# Patient Record
Sex: MALE | Race: ASIAN | NOT HISPANIC OR LATINO | URBAN - METROPOLITAN AREA
[De-identification: names, ages, dates, MRNs, and addresses within clinical notes are randomized per-mention and may not be internally consistent; named-entity substitution may affect disease eponyms.]

---

## 2021-05-18 VITALS
HEIGHT: 64.17 IN | HEART RATE: 65 BPM | OXYGEN SATURATION: 99 % | RESPIRATION RATE: 16 BRPM | SYSTOLIC BLOOD PRESSURE: 122 MMHG | DIASTOLIC BLOOD PRESSURE: 76 MMHG | TEMPERATURE: 98 F | WEIGHT: 147.93 LBS

## 2021-05-18 RX ORDER — CHLORHEXIDINE GLUCONATE 213 G/1000ML
1 SOLUTION TOPICAL ONCE
Refills: 0 | Status: DISCONTINUED | OUTPATIENT
Start: 2021-05-19 | End: 2021-05-19

## 2021-05-18 NOTE — H&P ADULT - HISTORY OF PRESENT ILLNESS
SKELETON    Cards: Dr Rich Johns  Covid:  Escort:  Pharmacy: [facility] [address] [place] [address], [address], Phone [phone]    55 y/o Male with FamHx of CAD (parents) and PMHx of HLD, GERD, BPH, who presented to his cardiologist, Dr Rich Johns, c/o gradually worsening, intermittent, non-radiating, substernal chest pressure, worsened with moderate exertion, relieved with rest, over the last few weeks.   Exercise Stress Test on 9/18/20: small amount of ischemia in the inferior location, EF 54%. Echo on 8/28/20: EF 55%, mild MR, trace TR.  In light of pt's risk factors, CCS Class III anginal symptoms, and abnormal stress test, pt presents for cardia cath. SKELETON    Cards: Dr Rich Johns  Covid: 5/17/21, NEGATIVE (in chart)   Escort:   Pharmacy: [facility]. [address] [place] [address], [address], [place] [facility], Phone [phone]    55 y/o Male with FamHx of CAD (parents) and PMHx of HLD, GERD, BPH, who presented to his cardiologist, Dr Rich Johns, c/o gradually worsening, intermittent, non-radiating, substernal chest pressure, worsened with moderate exertion, relieved with rest, over the last few weeks.  Exercise Stress Test on 9/18/20: small amount of ischemia in the inferior location, EF 54%. Echo on 8/28/20: EF 55%, mild MR, trace TR.  In light of pt's risk factors, CCS Class III anginal symptoms, and abnormal stress test, pt presents for cardia catheterization with possible intervention if clinically indicated. Cards: Dr Rich Johns  Covid: 5/17/21, NEGATIVE (in chart)   COVID vaccine: 4/28/21 (Moderna, fully vaccinated)   Escort: wife   Pharmacy: [facility]. [address] [place] [address], [address], [place] [facility], Phone [phone]    55 y/o Male with FamHx of CAD (parents) and PMHx of HLD, GERD, BPH, who presented to his cardiologist, Dr Rich Johns, c/o gradually worsening, intermittent, 7/10 non-radiating, substernal chest pressure with associated SOB, dizziness, palpitations, worsened with moderate exertion, relieved with rest, over the last few weeks.  Pt denies N/V/D, orthopnea, PND, LE edema, fever, chills, cough, sick contact or recent travel. Exercise Stress Test on 9/18/20: small amount of ischemia in the inferior location, EF 54%. Echo on 8/28/20: EF 55%, mild MR, trace TR.  In light of pt's risk factors, CCS Class III anginal symptoms, and abnormal stress test, pt presents for cardia catheterization with possible intervention if clinically indicated.

## 2021-05-18 NOTE — H&P ADULT - NSICDXFAMILYHX_GEN_ALL_CORE_FT
FAMILY HISTORY:  Father  Still living? Unknown  FH: CAD (coronary artery disease), Age at diagnosis: Age Unknown    Mother  Still living? Unknown  FH: CAD (coronary artery disease), Age at diagnosis: Age Unknown

## 2021-05-18 NOTE — H&P ADULT - ASSESSMENT
57 y/o Male with FamHx of CAD (parents) and PMHx of HLD, GERD, BPH, who presented to his cardiologist, Dr Rich Johns, c/o gradually worsening, intermittent, 7/10 non-radiating, substernal chest pressure with associated SOB, dizziness, palpitations, worsened with moderate exertion, relieved with rest, over the last few weeks. In light of pt's risk factors, CCS Class III anginal symptoms, and abnormal stress test, pt presents for cardia catheterization with possible intervention if clinically indicated.    -Consent obtained with [place] ID [id number]  -H/H _____, no signs of active bleeding, last dose of aspirin 81 mg QD 5/18/21. Loaded with aspirin 81 mg X1 and plavix 600 mg x1.  -EF 55% by ECHO, euvolemic on exam, pre cath fluids with NS @ 75 CC/HR     Risks & benefits of procedure and alternative therapy have been explained to the patient including but not limited to: allergic reaction, bleeding w/possible need for blood transfusion, infection, renal and vascular compromise, limb damage, arrhythmia, stroke, vessel dissection/perforation, Myocardial infarction, emergent CABG. Informed consent obtained and in chart.    57 y/o Male with FamHx of CAD (parents) and PMHx of HLD, GERD, BPH, who presented to his cardiologist, Dr Rich Johns, c/o gradually worsening, intermittent, 7/10 non-radiating, substernal chest pressure with associated SOB, dizziness, palpitations, worsened with moderate exertion, relieved with rest, over the last few weeks. In light of pt's risk factors, CCS Class III anginal symptoms, and abnormal stress test, pt presents for cardia catheterization with possible intervention if clinically indicated.    -Consent obtained with [place] , [place] ID [id number]  -H/H not resulted prior to case. outpt H/H 15.2/47.1 5/16/21 at [facility] with no signs of active bleeding, last dose of aspirin 81 mg QD 5/18/21. Load with aspirin 81 mg X1 and plavix 600 mg x1 on table as discussed w/ Fellow Dillon.   -EF 55% by ECHO, euvolemic on exam, pre cath fluids with NS @ 75 CC/HR     Risks & benefits of procedure and alternative therapy have been explained to the patient including but not limited to: allergic reaction, bleeding w/possible need for blood transfusion, infection, renal and vascular compromise, limb damage, arrhythmia, stroke, vessel dissection/perforation, Myocardial infarction, emergent CABG. Informed consent obtained and in chart.

## 2021-05-18 NOTE — H&P ADULT - NSICDXPASTMEDICALHX_GEN_ALL_CORE_FT
PAST MEDICAL HISTORY:  BPH (benign prostatic hyperplasia)     GERD (gastroesophageal reflux disease)     HLD (hyperlipidemia)

## 2021-05-19 ENCOUNTER — OUTPATIENT (OUTPATIENT)
Dept: OUTPATIENT SERVICES | Facility: HOSPITAL | Age: 56
LOS: 1 days | Discharge: NURSING, REHAB OR LONG-TERM CARE | End: 2021-05-19
Payer: MEDICAID

## 2021-05-19 LAB
A1C WITH ESTIMATED AVERAGE GLUCOSE RESULT: 5.7 % — HIGH (ref 4–5.6)
ALBUMIN SERPL ELPH-MCNC: 4.8 G/DL — SIGNIFICANT CHANGE UP (ref 3.3–5)
ALP SERPL-CCNC: 59 U/L — SIGNIFICANT CHANGE UP (ref 40–120)
ALT FLD-CCNC: 20 U/L — SIGNIFICANT CHANGE UP (ref 10–45)
ANION GAP SERPL CALC-SCNC: 10 MMOL/L — SIGNIFICANT CHANGE UP (ref 5–17)
APTT BLD: 33.5 SEC — SIGNIFICANT CHANGE UP (ref 27.5–35.5)
AST SERPL-CCNC: 17 U/L — SIGNIFICANT CHANGE UP (ref 10–40)
BASOPHILS # BLD AUTO: 0.02 K/UL — SIGNIFICANT CHANGE UP (ref 0–0.2)
BASOPHILS NFR BLD AUTO: 0.4 % — SIGNIFICANT CHANGE UP (ref 0–2)
BILIRUB SERPL-MCNC: 0.3 MG/DL — SIGNIFICANT CHANGE UP (ref 0.2–1.2)
BUN SERPL-MCNC: 14 MG/DL — SIGNIFICANT CHANGE UP (ref 7–23)
CALCIUM SERPL-MCNC: 9.9 MG/DL — SIGNIFICANT CHANGE UP (ref 8.4–10.5)
CHLORIDE SERPL-SCNC: 104 MMOL/L — SIGNIFICANT CHANGE UP (ref 96–108)
CHOLEST SERPL-MCNC: 144 MG/DL — SIGNIFICANT CHANGE UP
CK MB CFR SERPL CALC: <1 NG/ML — SIGNIFICANT CHANGE UP (ref 0–6.7)
CK SERPL-CCNC: 78 U/L — SIGNIFICANT CHANGE UP (ref 30–200)
CO2 SERPL-SCNC: 28 MMOL/L — SIGNIFICANT CHANGE UP (ref 22–31)
CREAT SERPL-MCNC: 0.82 MG/DL — SIGNIFICANT CHANGE UP (ref 0.5–1.3)
EOSINOPHIL # BLD AUTO: 0.1 K/UL — SIGNIFICANT CHANGE UP (ref 0–0.5)
EOSINOPHIL NFR BLD AUTO: 2 % — SIGNIFICANT CHANGE UP (ref 0–6)
ESTIMATED AVERAGE GLUCOSE: 117 MG/DL — HIGH (ref 68–114)
GLUCOSE SERPL-MCNC: 96 MG/DL — SIGNIFICANT CHANGE UP (ref 70–99)
HCT VFR BLD CALC: 45.7 % — SIGNIFICANT CHANGE UP (ref 39–50)
HDLC SERPL-MCNC: 30 MG/DL — LOW
HGB BLD-MCNC: 15.2 G/DL — SIGNIFICANT CHANGE UP (ref 13–17)
IMM GRANULOCYTES NFR BLD AUTO: 0 % — SIGNIFICANT CHANGE UP (ref 0–1.5)
INR BLD: 0.96 — SIGNIFICANT CHANGE UP (ref 0.88–1.16)
LIPID PNL WITH DIRECT LDL SERPL: 60 MG/DL — SIGNIFICANT CHANGE UP
LYMPHOCYTES # BLD AUTO: 2.51 K/UL — SIGNIFICANT CHANGE UP (ref 1–3.3)
LYMPHOCYTES # BLD AUTO: 49.9 % — HIGH (ref 13–44)
MCHC RBC-ENTMCNC: 30.3 PG — SIGNIFICANT CHANGE UP (ref 27–34)
MCHC RBC-ENTMCNC: 33.3 GM/DL — SIGNIFICANT CHANGE UP (ref 32–36)
MCV RBC AUTO: 91.2 FL — SIGNIFICANT CHANGE UP (ref 80–100)
MONOCYTES # BLD AUTO: 0.38 K/UL — SIGNIFICANT CHANGE UP (ref 0–0.9)
MONOCYTES NFR BLD AUTO: 7.6 % — SIGNIFICANT CHANGE UP (ref 2–14)
NEUTROPHILS # BLD AUTO: 2.02 K/UL — SIGNIFICANT CHANGE UP (ref 1.8–7.4)
NEUTROPHILS NFR BLD AUTO: 40.1 % — LOW (ref 43–77)
NON HDL CHOLESTEROL: 114 MG/DL — SIGNIFICANT CHANGE UP
NRBC # BLD: 0 /100 WBCS — SIGNIFICANT CHANGE UP (ref 0–0)
PLATELET # BLD AUTO: 318 K/UL — SIGNIFICANT CHANGE UP (ref 150–400)
POTASSIUM SERPL-MCNC: 4.2 MMOL/L — SIGNIFICANT CHANGE UP (ref 3.5–5.3)
POTASSIUM SERPL-SCNC: 4.2 MMOL/L — SIGNIFICANT CHANGE UP (ref 3.5–5.3)
PROT SERPL-MCNC: 7.8 G/DL — SIGNIFICANT CHANGE UP (ref 6–8.3)
PROTHROM AB SERPL-ACNC: 11.5 SEC — SIGNIFICANT CHANGE UP (ref 10.6–13.6)
RBC # BLD: 5.01 M/UL — SIGNIFICANT CHANGE UP (ref 4.2–5.8)
RBC # FLD: 12.1 % — SIGNIFICANT CHANGE UP (ref 10.3–14.5)
SODIUM SERPL-SCNC: 142 MMOL/L — SIGNIFICANT CHANGE UP (ref 135–145)
TRIGL SERPL-MCNC: 268 MG/DL — HIGH
WBC # BLD: 5.03 K/UL — SIGNIFICANT CHANGE UP (ref 3.8–10.5)
WBC # FLD AUTO: 5.03 K/UL — SIGNIFICANT CHANGE UP (ref 3.8–10.5)

## 2021-05-19 PROCEDURE — 85610 PROTHROMBIN TIME: CPT

## 2021-05-19 PROCEDURE — 83036 HEMOGLOBIN GLYCOSYLATED A1C: CPT

## 2021-05-19 PROCEDURE — 80053 COMPREHEN METABOLIC PANEL: CPT

## 2021-05-19 PROCEDURE — 36415 COLL VENOUS BLD VENIPUNCTURE: CPT

## 2021-05-19 PROCEDURE — C1894: CPT

## 2021-05-19 PROCEDURE — 80061 LIPID PANEL: CPT

## 2021-05-19 PROCEDURE — 99152 MOD SED SAME PHYS/QHP 5/>YRS: CPT

## 2021-05-19 PROCEDURE — 82550 ASSAY OF CK (CPK): CPT

## 2021-05-19 PROCEDURE — 85025 COMPLETE CBC W/AUTO DIFF WBC: CPT

## 2021-05-19 PROCEDURE — 93005 ELECTROCARDIOGRAM TRACING: CPT

## 2021-05-19 PROCEDURE — 85730 THROMBOPLASTIN TIME PARTIAL: CPT

## 2021-05-19 PROCEDURE — C1769: CPT

## 2021-05-19 PROCEDURE — C1887: CPT

## 2021-05-19 PROCEDURE — 93010 ELECTROCARDIOGRAM REPORT: CPT

## 2021-05-19 PROCEDURE — 82553 CREATINE MB FRACTION: CPT

## 2021-05-19 PROCEDURE — 93458 L HRT ARTERY/VENTRICLE ANGIO: CPT

## 2021-05-19 RX ORDER — METOPROLOL TARTRATE 50 MG
1 TABLET ORAL
Qty: 0 | Refills: 0 | DISCHARGE

## 2021-05-19 RX ORDER — SODIUM CHLORIDE 9 MG/ML
500 INJECTION INTRAMUSCULAR; INTRAVENOUS; SUBCUTANEOUS
Refills: 0 | Status: DISCONTINUED | OUTPATIENT
Start: 2021-05-19 | End: 2021-05-19

## 2021-05-19 RX ORDER — OMEPRAZOLE 10 MG/1
1 CAPSULE, DELAYED RELEASE ORAL
Qty: 0 | Refills: 0 | DISCHARGE

## 2021-05-19 RX ORDER — TAMSULOSIN HYDROCHLORIDE 0.4 MG/1
1 CAPSULE ORAL
Qty: 0 | Refills: 0 | DISCHARGE

## 2021-05-19 RX ORDER — ASPIRIN/CALCIUM CARB/MAGNESIUM 324 MG
1 TABLET ORAL
Qty: 0 | Refills: 0 | DISCHARGE

## 2021-05-19 RX ORDER — NITROGLYCERIN 6.5 MG
1 CAPSULE, EXTENDED RELEASE ORAL
Qty: 0 | Refills: 0 | DISCHARGE

## 2021-05-19 NOTE — PROGRESS NOTE ADULT - SUBJECTIVE AND OBJECTIVE BOX
Interventional Cardiology [facility] SDA Discharge Note    Patient without complaints. Ambulated and voided without difficulties  Afebrile, VSS  Ext: Right Radial :   no hematoma,  no   bleeding, dressing; C/D/I  Pulses:    intact RAD to baseline     A/P:    55 y/o Male with FamHx of CAD (parents) and PMHx of HLD, GERD, BPH, who presented to his cardiologist, Dr Rich Johns, c/o gradually worsening, intermittent, 7/10 non-radiating, substernal chest pressure with associated SOB, dizziness, palpitations, worsened with moderate exertion, relieved with rest, over the last few weeks.  Pt denies N/V/D, orthopnea, PND, LE edema, fever, chills, cough, sick contact or recent travel. Exercise Stress Test on 9/18/20: small amount of ischemia in the inferior location, EF 54%. Echo on 8/28/20: EF 55%, mild MR, trace TR.  In light of pt's risk factors, CCS Class III anginal symptoms, and abnormal stress test, pt presents for cardia catheterization with possible intervention if clinically indicated.  s/p cardiac cath (5/19/21): non-obstructive CAD; LM normal, LAD mild disease, LCx mild disease, mRCA 30%; LVEDp 7mmHg, No AS. R radial TR band used for hemostasis.     1.	Stable for discharge as per attending Dr. Comer after bed rest, pt voids, groin/wrist stable and 30 minutes of ambulation.  2.	Follow-up with Cardiologist Andreas in 1-2 weeks  3.	Discharged forms signed and copies in chart

## 2021-05-25 DIAGNOSIS — I10 ESSENTIAL (PRIMARY) HYPERTENSION: ICD-10-CM

## 2021-05-25 DIAGNOSIS — R94.39 ABNORMAL RESULT OF OTHER CARDIOVASCULAR FUNCTION STUDY: ICD-10-CM

## 2021-05-25 DIAGNOSIS — I25.119 ATHEROSCLEROTIC HEART DISEASE OF NATIVE CORONARY ARTERY WITH UNSPECIFIED ANGINA PECTORIS: ICD-10-CM

## 2021-05-25 DIAGNOSIS — E78.5 HYPERLIPIDEMIA, UNSPECIFIED: ICD-10-CM

## 2024-08-21 NOTE — H&P ADULT - PSYCHIATRIC
[facility] - Emergency Dept  [facility]  Progress Note    Patient Name: Armando Green  MRN: [id number]  Patient Class: IP- Inpatient   Admission Date: 8/20/2024  Length of Stay: 1 days  Attending Physician: Evelyne Lara MD  Primary Care Provider: Agustin Farnsworth MD        Subjective:     Principal Problem:Alcohol withdrawal        HPI:  57 yo M with PMHx alcohol abuse who presents to the ED with police from [facility] for alcohol withdrawal concerns and delirium. Pt. Reports was taken to psych appointment by son today, but the staff at the clinic noted pt. Delirious, sweating, tremulous. He reportedly started becoming aggressive and was unable to be reasoned with. Provider recommended he be treated for alcohol withdrawal after obtaining collateral from son. He was placed under PEC and police were called and brought patient to the ED. At time of my assessment, pt. Angry about being in the ED, but he denies any SI or HI. He does not display psychotic behavior and appears alert and oriented x 4, but sleepy after being given IV ativan.    Per notes from [facility] today: Since his last visit, he has been tying to detox by reducing his alcohol intake.he wakes up daily with tremors and shakes, has been vomiting every morning, very anxious, has to drink as soon as he gets up. He has been missing days of workUsual estimated consumption estimated to be just over 1/5 daily. He has been drinking for many years, has never been to rehab. He has never had hospitalization from withdrawals, no reported seizures.    Psych hx, previously hospitalized in for 10 days at [place] in early 2016 for paranoid delusions, was put on Zyprexa and Depakote. He has been on Zyprexa and Depakote since then.      Overview/Hospital Course:  Mr Green was admitted to hospital medicine for alcohol withdrawal. Started on valium taper. Psychatiry consulted, PEC in place on admit.     Interval History: Patient seen  at bedside. Reports some tremulousness but denies hallucinations, anxiety, abdominal issues. Overall he is feeling well. Continue valium taper. Psychiatry consulted. Continue PEC for now.    Review of Systems   Constitutional:  Negative for activity change, chills, diaphoresis, fever and unexpected weight change.   HENT:  Negative for congestion and sore throat.    Respiratory:  Negative for cough, shortness of breath and wheezing.    Cardiovascular:  Negative for chest pain, palpitations and leg swelling.   Gastrointestinal:  Negative for abdominal pain, blood in stool, nausea and vomiting.   Genitourinary:  Negative for dysuria and hematuria.   Musculoskeletal:  Negative for arthralgias and neck pain.   Skin:  Negative for color change and rash.   Neurological:  Positive for tremors. Negative for dizziness, seizures and numbness.   Psychiatric/Behavioral:  Negative for hallucinations and suicidal ideas. The patient is not nervous/anxious.      Objective:     Vital Signs (Most Recent):  Temp: 98 °F (36.7 °C) (08/21/24 1000)  Pulse: 66 (08/21/24 1100)  Resp: 20 (08/21/24 1100)  BP: (!) 165/90 (08/21/24 1100)  SpO2: 95 % (08/21/24 1100) Vital Signs (24h Range):  Temp:  [97.8 °F (36.6 °C)-98.3 °F (36.8 °C)] 98 °F (36.7 °C)  Pulse:  [] 66  Resp:  [16-26] 20  SpO2:  [92 %-98 %] 95 %  BP: (136-177)/() 165/90     Weight: 63.5 kg (140 lb)  Body mass index is 23.3 kg/m².    Intake/Output Summary (Last 24 hours) at 8/21/2024 1118  Last data filed at 8/21/2024 0020  Gross per 24 hour   Intake 1096.67 ml   Output --   Net 1096.67 ml         Physical Exam  Vitals reviewed.   Constitutional:       General: He is not in acute distress.     Appearance: He is well-developed.   HENT:      Head: Normocephalic and atraumatic.   Eyes:      Extraocular Movements: Extraocular movements intact.      Pupils: Pupils are equal, round, and reactive to light.   Neck:      Vascular: No JVD.      Trachea: No tracheal deviation.    Cardiovascular:      Rate and Rhythm: Normal rate and regular rhythm.      Heart sounds: No murmur heard.     No friction rub. No gallop.   Pulmonary:      Effort: No respiratory distress.      Breath sounds: Normal breath sounds. No wheezing or rales.   Abdominal:      General: Bowel sounds are normal. There is no distension.      Palpations: Abdomen is soft. There is no mass.      Tenderness: There is no abdominal tenderness.   Musculoskeletal:         General: No deformity.      Cervical back: Neck supple.   Lymphadenopathy:      Cervical: No cervical adenopathy.   Skin:     General: Skin is warm and dry.      Findings: No rash.   Neurological:      Mental Status: He is alert and oriented to person, place, and time.      Motor: Tremor present.             Significant Labs: All pertinent labs within the past 24 hours have been reviewed.  CBC:   Recent Labs   Lab 08/20/24  1120 08/21/24  0333   WBC 5.10 4.24   HGB 16.5 13.3*   HCT 47.5 39.1*   * 87*     CMP:   Recent Labs   Lab 08/20/24  1120 08/21/24  0333    138   K 3.3* 3.8    108   CO2 24 21*   * 86   BUN 5* 8   CREATININE 0.7 0.7   CALCIUM 8.7 8.1*   PROT 7.5 6.2   ALBUMIN 3.7 3.0*   BILITOT 0.7 1.4*   ALKPHOS 56 44*   * 83*   * 85*   ANIONGAP 10 9       Significant Imaging: I have reviewed all pertinent imaging results/findings within the past 24 hours.    Assessment/Plan:      * Alcohol withdrawal  -Pt. Reports cutting back significantly last 2 days, arrived agitated, confused, tremulous, CIWA >8  -Ethanol level 176 when patient displayed WD symptoms, making him higher risk  -Monitor on telemetry with scheduled valium taper starting at 10 mg Q6hrs. Ativan PRN ordered for CIWA >8  - MVI, thiamine, folate  - seizure precautions    Transaminitis  Suspet elevated 2/2 alcohol use  Denies abdominal pain  Monitor with daily labs    Psychiatric disturbance  -Pt. PEC'd at outpatient psych clinic for grave disability. Per  chart review, pt. On medications depakote and zyprexa, will continue for now  - continue CEC for now  -Psych consulted for assistance while admitted        VTE Risk Mitigation (From admission, onward)           Ordered     enoxaparin injection 40 mg  Every 24 hours         08/20/24 1742     IP VTE LOW RISK PATIENT  Once         08/20/24 1742     Place sequential compression device  Until discontinued         08/20/24 1742                    Discharge Planning   TIA:      Code Status: Full Code   Is the patient medically ready for discharge?:     Reason for patient still in hospital (select all that apply): Patient trending condition, Laboratory test, and Treatment                     Sunshine Porras PA-C  Department of Hospital Medicine   [facility] - Emergency Dept     not examined